# Patient Record
Sex: MALE | Race: WHITE | NOT HISPANIC OR LATINO | Employment: FULL TIME | URBAN - METROPOLITAN AREA
[De-identification: names, ages, dates, MRNs, and addresses within clinical notes are randomized per-mention and may not be internally consistent; named-entity substitution may affect disease eponyms.]

---

## 2024-10-14 NOTE — PROGRESS NOTES
[facility]  Cardiology Consultation Note     Mati Nevarez  1960  Requesting Provider: Jean Welsh MD  PCP: Jean Welsh MD    ID:  Mati Nevarez is a 64 y.o. male who resides in [place], [place]. he is a dialysis nurse    REASON FOR CONSULTATION:    Chest discomfort         Dear Dr. Welsh:    Thank you for referring Jovany Nevarez to my office today for evaluation.  He is a 64-year-old gentleman who was previously followed at [facility] cardiology for left bundle branch block, peripheral arterial disease, and CV risk factors.  About 10 years ago he underwent lower extremity revascularization with Dr. Sky.  Patient does not recall which leg was treated.  The patient has done well from a claudication standpoint since.    The patient recently has had dental issues and had full mouth extraction with plan for permanent implants in the future.  He has lost nearly 50 pounds during this time.    The patient reports he has been experiencing intermittent chest tightness.  The tightness occurs sporadically and does not particularly worse with exertion or relieved with rest.  He is had previous nuclear stress test in the past, none recent.    The patient has not had recent lung cancer screening CT.  Patient is a smoker half pack a day.      Past Medical History, Past Surgical History, Family history, Social History, and Medications were all reviewed with the patient today and updated as necessary.       Current Outpatient Medications:     rosuvastatin (CRESTOR) 40 MG tablet, Take 1 tablet by mouth Every Night for 90 days. Indications: Disease involving Lipid Deposits in the Arteries, Disp: , Rfl:     aspirin 81 MG EC tablet, Take 1 tablet by mouth Daily., Disp: 90 tablet, Rfl: 3    metoprolol tartrate (LOPRESSOR) 100 MG tablet, Take 100 mg at Bedtime the Night Before Coronary CTA Appointment and In the Morning 1 Hour Prior to Coronary CTA Appointment, Disp: 2  "tablet, Rfl: 0    Allergies   Allergen Reactions    Plavix [Clopidogrel] Swelling     \"Facial swelling\"    Erythromycin Rash         Past Medical History:   Diagnosis Date    Hyperlipidemia     LBBB (left bundle branch block)        Past Surgical History:   Procedure Laterality Date    ANGIOPLASTY / STENTING FEMORAL      CHOLECYSTECTOMY      KIDNEY STONE SURGERY         Family History   Problem Relation Age of Onset    Osteoporosis Mother     Heart disease Father     Heart valve disorder Father        Social History     Tobacco Use    Smoking status: Every Day     Current packs/day: 0.50     Average packs/day: 0.5 packs/day for 49.8 years (24.9 ttl pk-yrs)     Types: Cigarettes     Start date: 1975     Passive exposure: Past    Smokeless tobacco: Never   Substance Use Topics    Alcohol use: Never       Review of Systems   Constitutional: Negative for malaise/fatigue.   Eyes:  Negative for vision loss in left eye and vision loss in right eye.   Cardiovascular:  Positive for chest pain. Negative for dyspnea on exertion, near-syncope, orthopnea, palpitations, paroxysmal nocturnal dyspnea and syncope.   Musculoskeletal:  Negative for myalgias.   Neurological:  Negative for brief paralysis, excessive daytime sleepiness, focal weakness, numbness, paresthesias and weakness.   All other systems reviewed and are negative.              /72 (BP Location: Left arm, Patient Position: Sitting, Cuff Size: Adult)   Pulse 71   Ht 190.5 cm (75\")   Wt 70.4 kg (155 lb 3.2 oz)   SpO2 99%   BMI 19.40 kg/m²        Constitutional:       Appearance: Healthy appearance.   Eyes:      General: No scleral icterus.  Neck:      Thyroid: No thyroid mass.      Vascular: No carotid bruit or JVD. JVD normal.   Pulmonary:      Effort: Pulmonary effort is normal.      Breath sounds: Normal breath sounds.   Cardiovascular:      Normal rate. Regular rhythm.      Murmurs: There is no murmur.      No gallop.    Edema:     Peripheral edema " absent.   Skin:     General: Skin is warm. There is no cyanosis.   Neurological:      General: No focal deficit present.      Mental Status: Alert.   Psychiatric:         Attention and Perception: Attention normal.             ECG 12 Lead    Date/Time: 10/15/2024 9:30 AM  Performed by: Vikram Odom IV, MD    Authorized by: Vikram Odom IV, MD  Comparison: not compared with previous ECG   Previous ECG: no previous ECG available  Rhythm: sinus rhythm  Rate: normal  BPM: 71  Conduction: left bundle branch block    Clinical impression: abnormal EKG          Labs (8/22/2024):    Glucose 87, creat 0.72, BUN 9, Na 140, K 4.4, AST 13, ALT 12  WBC 9.9, RBC 5.35, HGB 17.1, HCT 51,   Chol 182, Trig 115,  HDL 40,          Diagnoses and all orders for this visit:    1. Atypical angina (Primary)  Overview:  Intermittent chest tightness  Left bundle branch block on EKG    Assessment & Plan:  Chest pain symptoms, possibly cardiac  Multiple cardiac risk factors including PAD, smoking, hyperlipidemia  Recommend CT coronary angiogram  Metoprolol tartrate 100 mg to be taken night before morning of scan    Orders:  -     CBC (No Diff); Future  -     aspirin 81 MG EC tablet; Take 1 tablet by mouth Daily.  Dispense: 90 tablet; Refill: 3  -     CT chest wo contrast; Future  -     CT Angiogram Coronary; Future  -     CT Angiogram Coronary-Cardiology Interpretation; Future  -     metoprolol tartrate (LOPRESSOR) tablet 200 mg  -     metoprolol tartrate (LOPRESSOR) tablet 150 mg  -     metoprolol tartrate (LOPRESSOR) tablet 100 mg  -     metoprolol tartrate (LOPRESSOR) tablet 50 mg  -     metoprolol tartrate (LOPRESSOR) tablet 25 mg  -     metoprolol tartrate (LOPRESSOR) tablet 50 mg  -     metoprolol tartrate (LOPRESSOR) injection 5 mg  -     nitroglycerin (NITROSTAT) SL tablet 0.4 mg  -     nitroglycerin (NITROSTAT) SL tablet 0.8 mg  -     ivabradine HCl (CORLANOR) tablet 15 mg  -     No Caffeine or  Nicotine 4 Hours Prior to CTA Appointment  -     Nothing to Eat or Drink 4 Hours Prior to CTA Appointment  -     Do Not Take Phosphodiasterase Inhibitors in the 72 Hours Prior to Coronary CTA  -     Obtain Informed Consent - Computed Tomography Angiography of Chest - Angiogram of Coronary Arteries; Standing  -     Vital Signs Upon Arrival; Standing  -     Cardiac Monitoring; Standing  -     Verify NPO Status - Patient to Be NPO at Least 4 Hours Prior to CTA; Standing  -     Notify CT After Administration of metoprolol tartrate (LOPRESSOR) tablet; Standing  -     Notify Provider If Total Metoprolol Given Equals 300mg & Heart Rate Not At Goal; Standing  -     Notify Provider Prior to Administration of Nitroglycerin if Patient SBP <80; Standing  -     POC Creatinine; Standing  -     Insert Peripheral IV; Standing  -     Saline Lock & Maintain IV Access; Standing  -     sodium chloride 0.9 % flush 10 mL  -     sodium chloride 0.9 % flush 10 mL  -     Vital Signs - See Instructions; Standing  -     Hold Medication Metformin (Glucophage, Glucophage XR, Fortament, Glumetza);  Metglip (metformin/glipizide);  Glucovance (metformin/glyburide); Avandamet (metformin/rosiglitazone); Standing  -     Patient May Discharge Home After Procedure Complete (If Stable); Standing  -     metoprolol tartrate (LOPRESSOR) 100 MG tablet; Take 100 mg at Bedtime the Night Before Coronary CTA Appointment and In the Morning 1 Hour Prior to Coronary CTA Appointment  Dispense: 2 tablet; Refill: 0    2. Peripheral artery disease  Overview:  Previous stent lower extremity  CT (2/2/2023): Aortic atherosclerosis with occlusion of LOC    Assessment & Plan:  No claudication symptoms  Start aspirin  Continue rosuvastatin    Orders:  -     aspirin 81 MG EC tablet; Take 1 tablet by mouth Daily.  Dispense: 90 tablet; Refill: 3    3. Hyperlipidemia LDL goal <50  Overview:  High intensity statin therapy indicated given the presence of CAD    Assessment &  Plan:  Obtain CMP and direct LDL today  If LDL >70, increase rosuvastatin or add evolocumab    Orders:  -     rosuvastatin (CRESTOR) 40 MG tablet; Take 1 tablet by mouth Every Night for 90 days. Indications: Disease involving Lipid Deposits in the Arteries  -     Comprehensive Metabolic Panel; Future  -     LDL Cholesterol, Direct; Future  -     Lipoprotein A (LPA); Future    4. LBBB (left bundle branch block)  Overview:  Noted on EKG, 2019    Assessment & Plan:  No syncope or near syncope to suggest high degree AV block                   CT coronary angiogram  Metoprolol tartrate 100 mg to be taken night before morning of scan  Start aspirin 81 mg daily  Obtain CMP, CBC, direct LDL, LP(a)  Smoking cessation recommended  If LDL >70, consider evolocumab  Return in about 6 months (around 4/15/2025).        MARKO Odom MD, FACC, [facility]  Interventional Cardiology  10/15/24  09:29 EDT

## 2024-10-15 ENCOUNTER — LAB (OUTPATIENT)
Dept: LAB | Facility: HOSPITAL | Age: 64
End: 2024-10-15
Payer: COMMERCIAL

## 2024-10-15 ENCOUNTER — OFFICE VISIT (OUTPATIENT)
Dept: CARDIOLOGY | Facility: CLINIC | Age: 64
End: 2024-10-15
Payer: COMMERCIAL

## 2024-10-15 VITALS
SYSTOLIC BLOOD PRESSURE: 128 MMHG | OXYGEN SATURATION: 99 % | HEART RATE: 71 BPM | HEIGHT: 75 IN | DIASTOLIC BLOOD PRESSURE: 72 MMHG | WEIGHT: 155.2 LBS | BODY MASS INDEX: 19.3 KG/M2

## 2024-10-15 DIAGNOSIS — I73.9 PERIPHERAL ARTERY DISEASE: ICD-10-CM

## 2024-10-15 DIAGNOSIS — E78.5 HYPERLIPIDEMIA LDL GOAL <50: ICD-10-CM

## 2024-10-15 DIAGNOSIS — I44.7 LBBB (LEFT BUNDLE BRANCH BLOCK): ICD-10-CM

## 2024-10-15 DIAGNOSIS — I20.89 ATYPICAL ANGINA: ICD-10-CM

## 2024-10-15 DIAGNOSIS — I20.89 ATYPICAL ANGINA: Primary | ICD-10-CM

## 2024-10-15 PROBLEM — F17.200 CURRENT SMOKER: Status: ACTIVE | Noted: 2024-10-15

## 2024-10-15 LAB
ALBUMIN SERPL-MCNC: 4.3 G/DL (ref 3.5–5.2)
ALBUMIN/GLOB SERPL: 1.4 G/DL
ALP SERPL-CCNC: 98 U/L (ref 39–117)
ALT SERPL W P-5'-P-CCNC: 11 U/L (ref 1–41)
ANION GAP SERPL CALCULATED.3IONS-SCNC: 6.2 MMOL/L (ref 5–15)
ARTICHOKE IGE QN: 124 MG/DL (ref 0–100)
AST SERPL-CCNC: 16 U/L (ref 1–40)
BILIRUB SERPL-MCNC: 0.3 MG/DL (ref 0–1.2)
BUN SERPL-MCNC: 7 MG/DL (ref 8–23)
BUN/CREAT SERPL: 9.6 (ref 7–25)
CALCIUM SPEC-SCNC: 10.3 MG/DL (ref 8.6–10.5)
CHLORIDE SERPL-SCNC: 102 MMOL/L (ref 98–107)
CO2 SERPL-SCNC: 29.8 MMOL/L (ref 22–29)
CREAT SERPL-MCNC: 0.73 MG/DL (ref 0.76–1.27)
DEPRECATED RDW RBC AUTO: 45.2 FL (ref 37–54)
EGFRCR SERPLBLD CKD-EPI 2021: 102.2 ML/MIN/1.73
ERYTHROCYTE [DISTWIDTH] IN BLOOD BY AUTOMATED COUNT: 12.9 % (ref 12.3–15.4)
GLOBULIN UR ELPH-MCNC: 3 GM/DL
GLUCOSE SERPL-MCNC: 88 MG/DL (ref 65–99)
HCT VFR BLD AUTO: 50.8 % (ref 37.5–51)
HGB BLD-MCNC: 17 G/DL (ref 13–17.7)
MCH RBC QN AUTO: 31.6 PG (ref 26.6–33)
MCHC RBC AUTO-ENTMCNC: 33.5 G/DL (ref 31.5–35.7)
MCV RBC AUTO: 94.4 FL (ref 79–97)
PLATELET # BLD AUTO: 225 10*3/MM3 (ref 140–450)
PMV BLD AUTO: 10.4 FL (ref 6–12)
POTASSIUM SERPL-SCNC: 4.6 MMOL/L (ref 3.5–5.2)
PROT SERPL-MCNC: 7.3 G/DL (ref 6–8.5)
RBC # BLD AUTO: 5.38 10*6/MM3 (ref 4.14–5.8)
SODIUM SERPL-SCNC: 138 MMOL/L (ref 136–145)
WBC NRBC COR # BLD AUTO: 8.93 10*3/MM3 (ref 3.4–10.8)

## 2024-10-15 PROCEDURE — 83721 ASSAY OF BLOOD LIPOPROTEIN: CPT

## 2024-10-15 PROCEDURE — 99204 OFFICE O/P NEW MOD 45 MIN: CPT | Performed by: INTERNAL MEDICINE

## 2024-10-15 PROCEDURE — 85027 COMPLETE CBC AUTOMATED: CPT

## 2024-10-15 PROCEDURE — 93000 ELECTROCARDIOGRAM COMPLETE: CPT | Performed by: INTERNAL MEDICINE

## 2024-10-15 PROCEDURE — 83695 ASSAY OF LIPOPROTEIN(A): CPT

## 2024-10-15 PROCEDURE — 36415 COLL VENOUS BLD VENIPUNCTURE: CPT

## 2024-10-15 PROCEDURE — 80053 COMPREHEN METABOLIC PANEL: CPT

## 2024-10-15 RX ORDER — METOPROLOL TARTRATE 25 MG/1
50 TABLET, FILM COATED ORAL ONCE
OUTPATIENT
Start: 2024-10-15

## 2024-10-15 RX ORDER — METOPROLOL TARTRATE 25 MG/1
100 TABLET, FILM COATED ORAL ONCE
OUTPATIENT
Start: 2024-10-15

## 2024-10-15 RX ORDER — METOPROLOL TARTRATE 25 MG/1
25 TABLET, FILM COATED ORAL ONCE
OUTPATIENT
Start: 2024-10-15

## 2024-10-15 RX ORDER — ROSUVASTATIN CALCIUM 40 MG/1
40 TABLET, COATED ORAL NIGHTLY
Start: 2024-10-15 | End: 2025-01-13

## 2024-10-15 RX ORDER — NITROGLYCERIN 0.4 MG/1
0.4 TABLET SUBLINGUAL
OUTPATIENT
Start: 2024-10-15 | End: 2024-10-15

## 2024-10-15 RX ORDER — METOPROLOL TARTRATE 100 MG
TABLET ORAL
Qty: 2 TABLET | Refills: 0 | Status: SHIPPED | OUTPATIENT
Start: 2024-10-15

## 2024-10-15 RX ORDER — SODIUM CHLORIDE 0.9 % (FLUSH) 0.9 %
10 SYRINGE (ML) INJECTION AS NEEDED
OUTPATIENT
Start: 2024-10-15

## 2024-10-15 RX ORDER — ROSUVASTATIN CALCIUM 40 MG/1
40 TABLET, COATED ORAL NIGHTLY
COMMUNITY
Start: 2024-09-13 | End: 2024-10-15 | Stop reason: SDUPTHER

## 2024-10-15 RX ORDER — METOPROLOL TARTRATE 25 MG/1
200 TABLET, FILM COATED ORAL ONCE
OUTPATIENT
Start: 2024-10-15 | End: 2024-10-15

## 2024-10-15 RX ORDER — IVABRADINE 5 MG/1
15 TABLET, FILM COATED ORAL ONCE
OUTPATIENT
Start: 2024-10-15 | End: 2024-10-15

## 2024-10-15 RX ORDER — ASPIRIN 81 MG/1
81 TABLET ORAL DAILY
Qty: 90 TABLET | Refills: 3 | Status: SHIPPED | OUTPATIENT
Start: 2024-10-15

## 2024-10-15 RX ORDER — METOPROLOL TARTRATE 50 MG
50 TABLET ORAL
OUTPATIENT
Start: 2024-10-15

## 2024-10-15 RX ORDER — METOPROLOL TARTRATE 1 MG/ML
5 INJECTION, SOLUTION INTRAVENOUS
OUTPATIENT
Start: 2024-10-15

## 2024-10-15 RX ORDER — NITROGLYCERIN 0.4 MG/1
0.8 TABLET SUBLINGUAL
OUTPATIENT
Start: 2024-10-15

## 2024-10-15 RX ORDER — SODIUM CHLORIDE 0.9 % (FLUSH) 0.9 %
10 SYRINGE (ML) INJECTION EVERY 12 HOURS SCHEDULED
OUTPATIENT
Start: 2024-10-15

## 2024-10-15 RX ORDER — METOPROLOL TARTRATE 25 MG/1
150 TABLET, FILM COATED ORAL ONCE
OUTPATIENT
Start: 2024-10-15

## 2024-10-15 NOTE — ASSESSMENT & PLAN NOTE
Chest pain symptoms, possibly cardiac  Multiple cardiac risk factors including PAD, smoking, hyperlipidemia  Recommend CT coronary angiogram  Metoprolol tartrate 100 mg to be taken night before morning of scan

## 2024-10-15 NOTE — LETTER
October 15, 2024     Jean Welsh MD  [address]  [address]  [facility] [address]    Patient: Mati Nevarez   YOB: 1960   Date of Visit: 10/15/2024     Dear Jean Welsh MD:       Thank you for referring Mati Nevarez to me for evaluation. Below are the relevant portions of my assessment and plan of care.    If you have questions, please do not hesitate to call me. I look forward to following Mati along with you.         Sincerely,        Vikram Odom IV, MD        CC: No Recipients    Vikram Odom IV, MD  10/15/24 0930  Signed  [facility] at [facility]  Cardiology Consultation Note     Mati Nevarez  1960  Requesting Provider: Jean Welsh MD  PCP: Jean Welsh MD    ID:  Mati Nevarez is a 64 y.o. 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 Welsh:    Thank you for referring Jovany Nevarez to my office today for evaluation.  He is a 64-year-old gentleman who was previously followed at [facility] cardiology for left bundle branch block, peripheral arterial disease, and CV risk factors.  About 10 years ago he underwent lower extremity revascularization with Dr. Sky.  Patient does not recall which leg was treated.  The patient has done well from a claudication standpoint since.    The patient recently has had dental issues and had full mouth extraction with plan for permanent implants in the future.  He has lost nearly 50 pounds during this time.    The patient reports he has been experiencing intermittent chest tightness.  The tightness occurs sporadically and does not particularly worse with exertion or relieved with rest.  He is had previous nuclear stress test in the past, none recent.    The patient has not had recent lung cancer screening CT.  Patient is a smoker half pack a day.      Past Medical History, Past Surgical  "History, Family history, Social History, and Medications were all reviewed with the patient today and updated as necessary.       Current Outpatient Medications:   •  rosuvastatin (CRESTOR) 40 MG tablet, Take 1 tablet by mouth Every Night for 90 days. Indications: Disease involving Lipid Deposits in the Arteries, Disp: , Rfl:   •  aspirin 81 MG EC tablet, Take 1 tablet by mouth Daily., Disp: 90 tablet, Rfl: 3  •  metoprolol tartrate (LOPRESSOR) 100 MG tablet, Take 100 mg at Bedtime the Night Before Coronary CTA Appointment and In the Morning 1 Hour Prior to Coronary CTA Appointment, Disp: 2 tablet, Rfl: 0    Allergies   Allergen Reactions   • Plavix [Clopidogrel] Swelling     \"Facial swelling\"   • Erythromycin Rash         Past Medical History:   Diagnosis Date   • Hyperlipidemia    • LBBB (left bundle branch block)        Past Surgical History:   Procedure Laterality Date   • ANGIOPLASTY / STENTING FEMORAL     • CHOLECYSTECTOMY     • KIDNEY STONE SURGERY         Family History   Problem Relation Age of Onset   • Osteoporosis Mother    • Heart disease Father    • Heart valve disorder Father        Social History     Tobacco Use   • Smoking status: Every Day     Current packs/day: 0.50     Average packs/day: 0.5 packs/day for 49.8 years (24.9 ttl pk-yrs)     Types: Cigarettes     Start date: 1975     Passive exposure: Past   • Smokeless tobacco: Never   Substance Use Topics   • Alcohol use: Never       Review of Systems   Constitutional: Negative for malaise/fatigue.   Eyes:  Negative for vision loss in left eye and vision loss in right eye.   Cardiovascular:  Positive for chest pain. Negative for dyspnea on exertion, near-syncope, orthopnea, palpitations, paroxysmal nocturnal dyspnea and syncope.   Musculoskeletal:  Negative for myalgias.   Neurological:  Negative for brief paralysis, excessive daytime sleepiness, focal weakness, numbness, paresthesias and weakness.   All other systems reviewed and are negative.     " "         /72 (BP Location: Left arm, Patient Position: Sitting, Cuff Size: Adult)   Pulse 71   Ht 190.5 cm (75\")   Wt 70.4 kg (155 lb 3.2 oz)   SpO2 99%   BMI 19.40 kg/m²        Constitutional:       Appearance: Healthy appearance.   Eyes:      General: No scleral icterus.  Neck:      Thyroid: No thyroid mass.      Vascular: No carotid bruit or JVD. JVD normal.   Pulmonary:      Effort: Pulmonary effort is normal.      Breath sounds: Normal breath sounds.   Cardiovascular:      Normal rate. Regular rhythm.      Murmurs: There is no murmur.      No gallop.    Edema:     Peripheral edema absent.   Skin:     General: Skin is warm. There is no cyanosis.   Neurological:      General: No focal deficit present.      Mental Status: Alert.   Psychiatric:         Attention and Perception: Attention normal.             ECG 12 Lead    Date/Time: 10/15/2024 9:30 AM  Performed by: Vikram Odom IV, MD    Authorized by: Vikram Odom IV, MD  Comparison: not compared with previous ECG   Previous ECG: no previous ECG available  Rhythm: sinus rhythm  Rate: normal  BPM: 71  Conduction: left bundle branch block    Clinical impression: abnormal EKG          Labs (8/22/2024):    Glucose 87, creat 0.72, BUN 9, Na 140, K 4.4, AST 13, ALT 12  WBC 9.9, RBC 5.35, HGB 17.1, HCT 51,   Chol 182, Trig 115,  HDL 40,          Diagnoses and all orders for this visit:    1. Atypical angina (Primary)  Overview:  Intermittent chest tightness  Left bundle branch block on EKG    Assessment & Plan:  Chest pain symptoms, possibly cardiac  Multiple cardiac risk factors including PAD, smoking, hyperlipidemia  Recommend CT coronary angiogram  Metoprolol tartrate 100 mg to be taken night before morning of scan    Orders:  -     CBC (No Diff); Future  -     aspirin 81 MG EC tablet; Take 1 tablet by mouth Daily.  Dispense: 90 tablet; Refill: 3  -     CT chest wo contrast; Future  -     CT Angiogram Coronary; " Future  -     CT Angiogram Coronary-Cardiology Interpretation; Future  -     metoprolol tartrate (LOPRESSOR) tablet 200 mg  -     metoprolol tartrate (LOPRESSOR) tablet 150 mg  -     metoprolol tartrate (LOPRESSOR) tablet 100 mg  -     metoprolol tartrate (LOPRESSOR) tablet 50 mg  -     metoprolol tartrate (LOPRESSOR) tablet 25 mg  -     metoprolol tartrate (LOPRESSOR) tablet 50 mg  -     metoprolol tartrate (LOPRESSOR) injection 5 mg  -     nitroglycerin (NITROSTAT) SL tablet 0.4 mg  -     nitroglycerin (NITROSTAT) SL tablet 0.8 mg  -     ivabradine HCl (CORLANOR) tablet 15 mg  -     No Caffeine or Nicotine 4 Hours Prior to CTA Appointment  -     Nothing to Eat or Drink 4 Hours Prior to CTA Appointment  -     Do Not Take Phosphodiasterase Inhibitors in the 72 Hours Prior to Coronary CTA  -     Obtain Informed Consent - Computed Tomography Angiography of Chest - Angiogram of Coronary Arteries; Standing  -     Vital Signs Upon Arrival; Standing  -     Cardiac Monitoring; Standing  -     Verify NPO Status - Patient to Be NPO at Least 4 Hours Prior to CTA; Standing  -     Notify CT After Administration of metoprolol tartrate (LOPRESSOR) tablet; Standing  -     Notify Provider If Total Metoprolol Given Equals 300mg & Heart Rate Not At Goal; Standing  -     Notify Provider Prior to Administration of Nitroglycerin if Patient SBP <80; Standing  -     POC Creatinine; Standing  -     Insert Peripheral IV; Standing  -     Saline Lock & Maintain IV Access; Standing  -     sodium chloride 0.9 % flush 10 mL  -     sodium chloride 0.9 % flush 10 mL  -     Vital Signs - See Instructions; Standing  -     Hold Medication Metformin (Glucophage, Glucophage XR, Fortament, Glumetza);  Metglip (metformin/glipizide);  Glucovance (metformin/glyburide); Avandamet (metformin/rosiglitazone); Standing  -     Patient May Discharge Home After Procedure Complete (If Stable); Standing  -     metoprolol tartrate (LOPRESSOR) 100 MG tablet; Take 100  mg at Bedtime the Night Before Coronary CTA Appointment and In the Morning 1 Hour Prior to Coronary CTA Appointment  Dispense: 2 tablet; Refill: 0    2. Peripheral artery disease  Overview:  Previous stent lower extremity  CT (2/2/2023): Aortic atherosclerosis with occlusion of LOC    Assessment & Plan:  No claudication symptoms  Start aspirin  Continue rosuvastatin    Orders:  -     aspirin 81 MG EC tablet; Take 1 tablet by mouth Daily.  Dispense: 90 tablet; Refill: 3    3. Hyperlipidemia LDL goal <50  Overview:  High intensity statin therapy indicated given the presence of CAD    Assessment & Plan:  Obtain CMP and direct LDL today  If LDL >70, increase rosuvastatin or add evolocumab    Orders:  -     rosuvastatin (CRESTOR) 40 MG tablet; Take 1 tablet by mouth Every Night for 90 days. Indications: Disease involving Lipid Deposits in the Arteries  -     Comprehensive Metabolic Panel; Future  -     LDL Cholesterol, Direct; Future  -     Lipoprotein A (LPA); Future    4. LBBB (left bundle branch block)  Overview:  Noted on EKG, 2019    Assessment & Plan:  No syncope or near syncope to suggest high degree AV block                   CT coronary angiogram  Metoprolol tartrate 100 mg to be taken night before morning of scan  Start aspirin 81 mg daily  Obtain CMP, CBC, direct LDL, LP(a)  Smoking cessation recommended  If LDL >70, consider evolocumab  Return in about 6 months (around 4/15/2025).        MARKO Odom MD, FACC, [facility]  Interventional Cardiology  10/15/24  09:29 EDT

## 2024-10-16 ENCOUNTER — TELEPHONE (OUTPATIENT)
Dept: CARDIOLOGY | Facility: CLINIC | Age: 64
End: 2024-10-16
Payer: COMMERCIAL

## 2024-10-16 ENCOUNTER — SPECIALTY PHARMACY (OUTPATIENT)
Dept: CARDIOLOGY | Facility: CLINIC | Age: 64
End: 2024-10-16
Payer: COMMERCIAL

## 2024-10-16 LAB — LPA SERPL-SCNC: 71.3 NMOL/L

## 2024-10-16 NOTE — TELEPHONE ENCOUNTER
Spoke with the patient and he is agreeable. Nancy or Kathy- do you want me to send the script and do the PA? I don't mind to but don't want to mess you up.

## 2024-10-16 NOTE — TELEPHONE ENCOUNTER
----- Message from Vikram Odom sent at 10/15/2024  5:26 PM EDT -----  Ladies can we get this gentleman evolocumab?

## 2024-10-30 ENCOUNTER — SPECIALTY PHARMACY (OUTPATIENT)
Dept: GENERAL RADIOLOGY | Facility: HOSPITAL | Age: 64
End: 2024-10-30
Payer: COMMERCIAL

## 2024-10-30 NOTE — PROGRESS NOTES
" Specialty Pharmacy Patient Management Program  Cardiology Initial Assessment     Mati Nevarez was referred by a Cardiology provider to the Cardiology Patient Management program offered by [facility] for Hyperlipidemia on 10/30/24.  An initial outreach was conducted, including assessment of therapy appropriateness and specialty medication education for Repatha. The patient was introduced to services offered by [facility], including: regular assessments, refill coordination, mail order delivery options, prior authorization maintenance, and financial assistance programs as applicable. The patient was also provided with contact information for the pharmacy team.     Insurance Coverage & Financial Support  BCBS & Copay card     Relevant Past Medical History and Comorbidities  Relevant medical history and concomitant health conditions were discussed with the patient. The patient's chart has been reviewed for relevant past medical history and comorbid conditions and updated as necessary.  Past Medical History:   Diagnosis Date    Hyperlipidemia     LBBB (left bundle branch block)      Social History     Socioeconomic History    Marital status:    Tobacco Use    Smoking status: Every Day     Current packs/day: 0.50     Average packs/day: 0.5 packs/day for 49.8 years (24.9 ttl pk-yrs)     Types: Cigarettes     Start date: 1975     Passive exposure: Past    Smokeless tobacco: Never   Vaping Use    Vaping status: Never Used   Substance and Sexual Activity    Alcohol use: Never    Drug use: Never    Sexual activity: Defer       Problem list reviewed by Nancy Espinal, PharmD on 10/30/2024 at 11:14 AM    Allergies  Known allergies and reactions were discussed with the patient. The patient's chart has been reviewed for  allergy information and updated as necessary.   Allergies   Allergen Reactions    Plavix [Clopidogrel] Swelling     \"Facial swelling\"    Erythromycin Rash "       Allergies reviewed by Nancy Espinal, PharmD on 10/30/2024 at 11:14 AM    Relevant Laboratory Values  Relevant laboratory values were discussed with the patient. The following specialty medication dose adjustment(s) are recommended: None    Lab Results   Component Value Date    GLUCOSE 88 10/15/2024    CALCIUM 10.3 10/15/2024     10/15/2024    K 4.6 10/15/2024    CO2 29.8 (H) 10/15/2024     10/15/2024    BUN 7 (L) 10/15/2024    CREATININE 0.73 (L) 10/15/2024    BCR 9.6 10/15/2024    ANIONGAP 6.2 10/15/2024     Lab Results   Component Value Date     (H) 10/15/2024         Current Medication List  This medication list has been reviewed with the patient and evaluated for any interactions or necessary modifications/recommendations, and updated to include all prescription medications, OTC medications, and supplements the patient is currently taking.  This list reflects what is contained in the patient's profile, which has also been marked as reviewed to communicate to other providers it is the most up to date version of the patient's current medication therapy.     Current Outpatient Medications:     Evolocumab (REPATHA) solution auto-injector SureClick injection, Inject 1 mL under the skin into the appropriate area as directed Every 14 (Fourteen) Days., Disp: 6 mL, Rfl: 3    aspirin 81 MG EC tablet, Take 1 tablet by mouth Daily., Disp: 90 tablet, Rfl: 3    metoprolol tartrate (LOPRESSOR) 100 MG tablet, Take 100 mg at Bedtime the Night Before Coronary CTA Appointment and In the Morning 1 Hour Prior to Coronary CTA Appointment, Disp: 2 tablet, Rfl: 0    rosuvastatin (CRESTOR) 40 MG tablet, Take 1 tablet by mouth Every Night for 90 days. Indications: Disease involving Lipid Deposits in the Arteries, Disp: , Rfl:     Medicines reviewed by Nancy Espinal, PharmD on 10/30/2024 at 11:14 AM    Drug Interactions  None  Recommended Medications Assessment  Aspirin: Currently Taking   Statin: Currently  Taking   ACEi/ARB: Not Taking Currently    Initial Education Provided for Specialty Medication  The patient has been provided with the following education and any applicable administration techniques (i.e. self-injection) have been demonstrated for the therapies indicated. All questions and concerns have been addressed prior to the patient receiving the medication, and the patient has verbalized comprehension of the education and any materials provided. Additional patient education shall be provided and documented upon request by the patient, provider, or payer.    REPATHA® (evolocumab)  Medication Expectations   Why am I taking this medication? You are taking Repatha to lower your “bad” cholesterol (LDL-C). This medication can be used in adults with high blood cholesterol including primary hyperlipidemia and familial hypercholesterolemia.    What should I expect while on this medication? You should expect to see your cholesterol improve over time. Specifically, you should see your LDL-C decrease.    How does the medication work? Repatha works by blocking a protein called PCSK9 that contributes to high levels of bad cholesterol. It helps increase your liver's ability to remove bad cholesterol from your blood.     How long will I be on this medication for? The amount of time you will be on this medication will be determined by your doctor based on your cholesterol and/or your risk of having a cardiac event. You will most likely be on this medication or another cholesterol medication throughout your lifetime. Do not abruptly stop this medication without talking to your doctor first.    How do I take this medication? Take as directed on your prescription label. Repatha is injected under the skin (subcutaneously) of your stomach, thigh, or upper arm. This medication is usually given one or twice a month.   What are some possible side effects? The most common side effects of Repatha include redness, itching, swelling, or  pain/tenderness at the injection site, symptoms of the common cold, flu or flu-like symptoms or back pain.    What happens if I miss a dose? If you miss a dose, take it as soon as you remember if it is within 7 days from the usual day of administration then resume your original schedule. If it is beyond 7 days and you use the michael-2-week dose, skip the missed dose and resume your normal dosing schedule.If it is beyond 7 days and you use the once-monthly dose, inject the dose and start a new schedule based on that date.      Medication Safety   What are things I should warn my doctor immediately about? Talk to your doctor if you are pregnant, planning to become pregnant, or breastfeeding. Stop the medication and tell your doctor or seek emergency medical help if you notice any signs/symptoms of an allergic reaction (severe rash, redness, hives, severe itching, trouble breathing, or swelling of the face, lips, or tongue). If you have a rubber or latex allergy, you should not use the Repatha SureClick® Autoinjector pen or the prefilled syringe, please notify your doctor or pharmacist.   What are things that I should be cautious of? Be cautious of any side effects from this medication. Talk to your doctor if any new ones develop or aren't getting better.   What are some medications that can interact with this one? There are no known significant drug interactions with Repatha. Always tell your doctor or pharmacist immediately if you start taking any new medications, including over-the-counter medications, vitamins, and herbal supplements.      Medication Storage/Handling   How should I handle this medication? Do not shake or expose the pens, cartridges, or syringes to extreme heat or direct sunlight. Keep this medication out of reach of pets/children. Allow medication to warm at room temperature prior to administration.   How does this medication need to be stored? Store unused pens, cartridges, or syringes in the  refrigerator in the original cartons to protect from light. If needed, Repatha may be kept at room temperature in the original carton for up to 30 days. Do not freeze.    How should I dispose of this medication? All the Repatha devices are single-dose and should be discarded in a sharps container after use. If your doctor decides to stop this medication, take to your local police station for proper disposal. Some pharmacies also have take-back bins for medication drop-off.      Resources/Support   How can I remind myself to take this medication? You can download reminder apps to help you manage your refills. You may also set an alarm on your phone to remind you to take your dose.    Is financial support available?  [facility] can provide co-pay cards if you have commercial insurance or patient assistance if you have Medicare or no insurance.    Which vaccines are recommended for me? Talk to your doctor about these vaccines: Flu, Coronavirus (COVID-19), Pneumococcal (pneumonia), Tdap, Hepatitis B, Zoster (shingles)          Adherence and Self-Administration  Adherence related to the patient's specialty therapy was discussed with the patient. The Adherence segment of this outreach has been reviewed and updated.     Is there a concern with patient's ability to self administer the medication correctly and without issue?: No  Were any potential barriers to adherence identified during the initial assessment or patient education?: No  Are there any concerns regarding the patient's understanding of the importance of medication adherence?: No  Methods for Supporting Patient Adherence and/or Self-Administration: None; patient is a RN    Open Medication Therapy Problems  No medication therapy recommendations to display    Goals of Therapy  Goals related to the patient's specialty therapy were discussed with the patient. The Patient Goals segment of this outreach has been reviewed and updated.   Goals Addressed Today         Specialty Pharmacy General Goal      LDL Goal < 50 mg/dL    Lab Results   Component Value Date     (H) 10/15/2024                  Reassessment Plan & Follow-Up  1. Medication Therapy Changes: Begin Repatha 140mg subcutaneous every 14 days  2. Related Plans, Therapy Recommendations, or Therapy Problems to Be Addressed: None  3. Pharmacist to perform regular assessments no more than (6) months from the previous assessment.  4. Care Coordinator to set up future refill outreaches, coordinate prescription delivery, and escalate clinical questions to pharmacist.  5. Welcome information and patient satisfaction survey to be sent by specialty pharmacy team with patient's initial fill.    Attestation  Therapeutic appropriateness: Appropriate   I attest the patient was actively involved in and has agreed to the above plan of care. If the prescribed therapy is at any point deemed not appropriate based on the current or future assessments, a consultation will be initiated with the patient's specialty care provider to determine the best course of action. The revised plan of therapy will be documented along with any required assessments and/or additional patient education provided.     Nancy Espinal, PharmD, BCPS  Clinical Specialty Pharmacist, Cardiology  10/30/2024  11:15 EDT

## 2024-11-22 ENCOUNTER — SPECIALTY PHARMACY (OUTPATIENT)
Dept: CARDIOLOGY | Facility: CLINIC | Age: 64
End: 2024-11-22
Payer: COMMERCIAL

## 2024-11-22 NOTE — PROGRESS NOTES
Specialty Pharmacy Refill Coordination Note     Mati is a 64 y.o. male contacted today regarding refills of Repatha 140 mg SC every 14 days specialty medication(s).    Delivery scheduled for Tuesday, 11/26/24.    Specialty medication(s) and dose(s) confirmed: yes    Refill Questions      Flowsheet Row Most Recent Value   Changes to allergies? No   Changes to medications? No   New conditions or infections since last clinic visit No   Unplanned office visit, urgent care, ED, or hospital admission in the last 4 weeks  No   How does patient/caregiver feel medication is working? Very good   Financial problems or insurance changes  No   Since the previous refill, were any specialty medication doses or scheduled injections missed or delayed?  No   Does this patient require a clinical escalation to a pharmacist? No            Delivery Questions      Flowsheet Row Most Recent Value   Delivery method UPS   Delivery address verified with patient/caregiver? Yes   Delivery address Home   Number of medications in delivery 1   Medication(s) being filled and delivered Evolocumab (REPATHA)   Doses left of specialty medications 0   Copay verified? Yes   Copay amount 5.00   Copay form of payment Credit/debit on file   Ship Date 11/25/24   Delivery Date 11/26/24   Signature Required No                   Follow-up: 28 day(s)     Kathy Wilder, Pharmacy Technician  Specialty Pharmacy Technician

## 2024-12-18 ENCOUNTER — SPECIALTY PHARMACY (OUTPATIENT)
Dept: GENERAL RADIOLOGY | Facility: HOSPITAL | Age: 64
End: 2024-12-18
Payer: COMMERCIAL

## 2024-12-18 NOTE — PROGRESS NOTES
Specialty Pharmacy Refill Coordination Note     Mati is a 64 y.o. male contacted today regarding refills of Repatha specialty medication(s).    Reviewed and verified with patient:  Allergies  Meds  Problems          Specialty medication(s) and dose(s) confirmed: yes    Refill Questions      Flowsheet Row Most Recent Value   Changes to allergies? No   Changes to medications? No   New conditions or infections since last clinic visit No   Unplanned office visit, urgent care, ED, or hospital admission in the last 4 weeks  No   How does patient/caregiver feel medication is working? Very good   Financial problems or insurance changes  No   Since the previous refill, were any specialty medication doses or scheduled injections missed or delayed?  No   Does this patient require a clinical escalation to a pharmacist? No          Delivery Questions      Flowsheet Row Most Recent Value   Delivery method UPS   Delivery address verified with patient/caregiver? Yes   Delivery address Home   Number of medications in delivery 1   Medication(s) being filled and delivered Evolocumab (REPATHA)   Doses left of specialty medications 1 pen   Copay verified? Yes   Copay amount $5   Copay form of payment Credit/debit on file   Ship Date 12/18/24   Delivery Date 12/19/24   Signature Required No                 Follow-up: 28 day(s)    Nancy Espinal, PharmD  12/18/2024  10:53 EST

## 2025-01-16 ENCOUNTER — SPECIALTY PHARMACY (OUTPATIENT)
Dept: CARDIOLOGY | Facility: CLINIC | Age: 65
End: 2025-01-16
Payer: COMMERCIAL

## 2025-01-16 NOTE — PROGRESS NOTES
Flipping Enrollment to external. No longer have payor access. Patient has been notified.        Kathy Wilder CPhT  Pharmacy Care Coordinator  [facility]  [facility]  [phone]  1/16/2025  10:38 EST